# Patient Record
Sex: FEMALE | Race: WHITE
[De-identification: names, ages, dates, MRNs, and addresses within clinical notes are randomized per-mention and may not be internally consistent; named-entity substitution may affect disease eponyms.]

---

## 2017-06-27 ENCOUNTER — HOSPITAL ENCOUNTER (INPATIENT)
Dept: HOSPITAL 83 - ED | Age: 82
LOS: 3 days | Discharge: HOME | DRG: 195 | End: 2017-06-30
Attending: INTERNAL MEDICINE | Admitting: INTERNAL MEDICINE
Payer: MEDICARE

## 2017-06-27 VITALS — DIASTOLIC BLOOD PRESSURE: 48 MMHG

## 2017-06-27 VITALS — DIASTOLIC BLOOD PRESSURE: 82 MMHG

## 2017-06-27 VITALS — DIASTOLIC BLOOD PRESSURE: 58 MMHG

## 2017-06-27 VITALS — DIASTOLIC BLOOD PRESSURE: 84 MMHG

## 2017-06-27 VITALS — HEIGHT: 60 IN | BODY MASS INDEX: 25.6 KG/M2 | WEIGHT: 130.38 LBS

## 2017-06-27 DIAGNOSIS — J18.9: Primary | ICD-10-CM

## 2017-06-27 DIAGNOSIS — R07.81: ICD-10-CM

## 2017-06-27 DIAGNOSIS — Z88.0: ICD-10-CM

## 2017-06-27 LAB
ALBUMIN SERPL-MCNC: 3.3 GM/DL (ref 3.1–4.5)
ALP SERPL-CCNC: 97 U/L (ref 45–117)
ALT SERPL W P-5'-P-CCNC: 13 U/L (ref 12–78)
AST SERPL-CCNC: 16 IU/L (ref 3–35)
BASOPHILS # BLD AUTO: 0.1 10*3/UL (ref 0–0.1)
BASOPHILS NFR BLD AUTO: 0.9 % (ref 0–1)
BUN SERPL-MCNC: 21 MG/DL (ref 7–24)
CHLORIDE SERPL-SCNC: 103 MMOL/L (ref 98–107)
CK MB SERPL-MCNC: < 0.5 NG/ML (ref 0.5–3.6)
CK SERPL-CCNC: 23 U/L (ref 26–192)
CO2 SERPL-SCNC: 27 MMOL/L (ref 21–32)
CRP SERPL-MCNC: 2.64 MG/DL (ref 0–0.3)
EOSINOPHIL # BLD AUTO: 0.6 10*3/UL (ref 0–0.4)
EOSINOPHIL # BLD AUTO: 8.4 % (ref 1–4)
ERYTHROCYTE [DISTWIDTH] IN BLOOD BY AUTOMATED COUNT: 13.1 % (ref 0–14.5)
GLUCOSE SERPL-MCNC: 93 MG/DL (ref 65–99)
HCT VFR BLD AUTO: 36 % (ref 37–47)
HGB BLD-MCNC: 11.5 G/DL (ref 12–16)
IG #: 0 10*3/UL (ref 0–0.1)
INR BLD: 0.9 (ref 2–3.5)
LYMPHOCYTES # BLD AUTO: 2.1 10*3/UL (ref 1.3–4.4)
LYMPHOCYTES NFR BLD AUTO: 27.8 % (ref 27–41)
MAGNESIUM SERPL-MCNC: 2.9 MG/DL (ref 1.5–2.1)
MCH RBC QN AUTO: 29 PG (ref 27–31)
MCHC RBC AUTO-ENTMCNC: 31.9 G/DL (ref 33–37)
MCV RBC AUTO: 90.7 FL (ref 81–99)
MONOCYTES # BLD AUTO: 0.8 10*3/UL (ref 0.1–1)
MONOCYTES NFR BLD MANUAL: 9.8 % (ref 3–9)
MYOGLOBIN SERPL-MCNC: 53 NG/ML (ref 13–71)
NEUT #: 4 10*3/UL (ref 2.3–7.9)
NEUT %: 52.7 % (ref 47–73)
NRBC BLD QL AUTO: 0 % (ref 0–0)
PLATELET # BLD AUTO: 368 10*3/UL (ref 130–400)
PMV BLD AUTO: 9.5 FL (ref 9.6–12.3)
POTASSIUM SERPL-SCNC: 4.5 MMOL/L (ref 3.5–5.1)
PROT SERPL-MCNC: 7.6 GM/DL (ref 6.4–8.2)
PROTHROMBIN TIME: 9.8 SECONDS (ref 9–12.4)
RBC # BLD AUTO: 3.97 10*6/UL (ref 4.1–5.1)
SODIUM SERPL-SCNC: 138 MMOL/L (ref 136–145)
TROPONIN I SERPL-MCNC: < 0.015 NG/ML (ref ?–0.04)
WBC NRBC COR # BLD AUTO: 7.7 10*3/UL (ref 4.8–10.8)

## 2017-06-28 VITALS — DIASTOLIC BLOOD PRESSURE: 68 MMHG | SYSTOLIC BLOOD PRESSURE: 124 MMHG

## 2017-06-28 VITALS — DIASTOLIC BLOOD PRESSURE: 75 MMHG

## 2017-06-28 VITALS — DIASTOLIC BLOOD PRESSURE: 67 MMHG

## 2017-06-28 VITALS — DIASTOLIC BLOOD PRESSURE: 62 MMHG

## 2017-06-28 VITALS — DIASTOLIC BLOOD PRESSURE: 76 MMHG | SYSTOLIC BLOOD PRESSURE: 146 MMHG

## 2017-06-28 VITALS — DIASTOLIC BLOOD PRESSURE: 68 MMHG

## 2017-06-29 VITALS — SYSTOLIC BLOOD PRESSURE: 130 MMHG | DIASTOLIC BLOOD PRESSURE: 79 MMHG

## 2017-06-29 VITALS — DIASTOLIC BLOOD PRESSURE: 51 MMHG | SYSTOLIC BLOOD PRESSURE: 102 MMHG

## 2017-06-29 VITALS — DIASTOLIC BLOOD PRESSURE: 62 MMHG | SYSTOLIC BLOOD PRESSURE: 100 MMHG

## 2017-06-29 VITALS — DIASTOLIC BLOOD PRESSURE: 71 MMHG

## 2017-06-29 VITALS — SYSTOLIC BLOOD PRESSURE: 138 MMHG | DIASTOLIC BLOOD PRESSURE: 64 MMHG

## 2017-06-30 VITALS — DIASTOLIC BLOOD PRESSURE: 78 MMHG | SYSTOLIC BLOOD PRESSURE: 138 MMHG

## 2017-06-30 VITALS — DIASTOLIC BLOOD PRESSURE: 74 MMHG | SYSTOLIC BLOOD PRESSURE: 136 MMHG

## 2017-06-30 LAB
BASOPHILS # BLD AUTO: 0 10*3/UL (ref 0–0.1)
BASOPHILS NFR BLD AUTO: 0.1 % (ref 0–1)
BUN SERPL-MCNC: 45 MG/DL (ref 7–24)
CHLORIDE SERPL-SCNC: 104 MMOL/L (ref 98–107)
CO2 SERPL-SCNC: 26 MMOL/L (ref 21–32)
EOSINOPHIL # BLD AUTO: 0 % (ref 1–4)
EOSINOPHIL # BLD AUTO: 0 10*3/UL (ref 0–0.4)
ERYTHROCYTE [DISTWIDTH] IN BLOOD BY AUTOMATED COUNT: 13.2 % (ref 0–14.5)
GLUCOSE SERPL-MCNC: 123 MG/DL (ref 65–99)
HCT VFR BLD AUTO: 31.8 % (ref 37–47)
HGB BLD-MCNC: 10.2 G/DL (ref 12–16)
IG #: 0.1 10*3/UL (ref 0–0.1)
LYMPHOCYTES # BLD AUTO: 1.5 10*3/UL (ref 1.3–4.4)
LYMPHOCYTES NFR BLD AUTO: 11.7 % (ref 27–41)
MCH RBC QN AUTO: 28.8 PG (ref 27–31)
MCHC RBC AUTO-ENTMCNC: 32.1 G/DL (ref 33–37)
MCV RBC AUTO: 89.8 FL (ref 81–99)
MONOCYTES # BLD AUTO: 0.5 10*3/UL (ref 0.1–1)
MONOCYTES NFR BLD MANUAL: 4.3 % (ref 3–9)
NEUT #: 10.4 10*3/UL (ref 2.3–7.9)
NEUT %: 83.3 % (ref 47–73)
NRBC BLD QL AUTO: 0 10*3/UL (ref 0–0)
PLATELET # BLD AUTO: 343 10*3/UL (ref 130–400)
PMV BLD AUTO: 10 FL (ref 9.6–12.3)
POTASSIUM SERPL-SCNC: 4.4 MMOL/L (ref 3.5–5.1)
RBC # BLD AUTO: 3.54 10*6/UL (ref 4.1–5.1)
SODIUM SERPL-SCNC: 139 MMOL/L (ref 136–145)
WBC NRBC COR # BLD AUTO: 12.5 10*3/UL (ref 4.8–10.8)

## 2017-08-30 ENCOUNTER — HOSPITAL ENCOUNTER (INPATIENT)
Dept: HOSPITAL 83 - 5E | Age: 82
LOS: 6 days | Discharge: HOME | DRG: 177 | End: 2017-09-05
Attending: INTERNAL MEDICINE | Admitting: INTERNAL MEDICINE
Payer: MEDICARE

## 2017-08-30 VITALS — HEIGHT: 58.98 IN | BODY MASS INDEX: 23.64 KG/M2 | WEIGHT: 117.25 LBS

## 2017-08-30 VITALS — DIASTOLIC BLOOD PRESSURE: 71 MMHG

## 2017-08-30 VITALS — SYSTOLIC BLOOD PRESSURE: 125 MMHG | DIASTOLIC BLOOD PRESSURE: 63 MMHG

## 2017-08-30 VITALS — DIASTOLIC BLOOD PRESSURE: 64 MMHG

## 2017-08-30 DIAGNOSIS — Z87.81: ICD-10-CM

## 2017-08-30 DIAGNOSIS — J15.6: Primary | ICD-10-CM

## 2017-08-30 DIAGNOSIS — D50.9: ICD-10-CM

## 2017-08-30 DIAGNOSIS — G89.29: ICD-10-CM

## 2017-08-30 DIAGNOSIS — I10: ICD-10-CM

## 2017-08-30 DIAGNOSIS — Z79.899: ICD-10-CM

## 2017-08-30 DIAGNOSIS — J96.01: ICD-10-CM

## 2017-08-30 DIAGNOSIS — M54.5: ICD-10-CM

## 2017-08-30 DIAGNOSIS — I48.0: ICD-10-CM

## 2017-08-30 DIAGNOSIS — K26.4: ICD-10-CM

## 2017-08-30 DIAGNOSIS — R62.7: ICD-10-CM

## 2017-08-30 DIAGNOSIS — Z87.891: ICD-10-CM

## 2017-08-30 DIAGNOSIS — E87.6: ICD-10-CM

## 2017-08-30 DIAGNOSIS — E03.9: ICD-10-CM

## 2017-08-30 DIAGNOSIS — K92.2: ICD-10-CM

## 2017-08-30 DIAGNOSIS — Z88.0: ICD-10-CM

## 2017-08-30 DIAGNOSIS — R63.4: ICD-10-CM

## 2017-08-30 DIAGNOSIS — Z82.49: ICD-10-CM

## 2017-08-30 DIAGNOSIS — D47.3: ICD-10-CM

## 2017-08-30 DIAGNOSIS — M80.00XG: ICD-10-CM

## 2017-08-30 LAB
BASOPHILS # BLD AUTO: 0.1 10*3/UL (ref 0–0.1)
BASOPHILS NFR BLD AUTO: 0.6 % (ref 0–1)
BUN SERPL-MCNC: 13 MG/DL (ref 7–24)
CHLORIDE SERPL-SCNC: 100 MMOL/L (ref 98–107)
CREAT SERPL-MCNC: 0.84 MG/DL (ref 0.55–1.02)
EOSINOPHIL # BLD AUTO: 0.1 10*3/UL (ref 0–0.4)
EOSINOPHIL # BLD AUTO: 0.9 % (ref 1–4)
ERYTHROCYTE [DISTWIDTH] IN BLOOD BY AUTOMATED COUNT: 16.1 % (ref 0–14.5)
HCT VFR BLD AUTO: 28.3 % (ref 37–47)
HGB BLD-MCNC: 8.4 G/DL (ref 12–16)
LYMPHOCYTES # BLD AUTO: 2.1 10*3/UL (ref 1.3–4.4)
LYMPHOCYTES NFR BLD AUTO: 25.8 % (ref 27–41)
MCH RBC QN AUTO: 24.6 PG (ref 27–31)
MCHC RBC AUTO-ENTMCNC: 29.7 G/DL (ref 33–37)
MCV RBC AUTO: 82.7 FL (ref 81–99)
MONOCYTES # BLD AUTO: 0.6 10*3/UL (ref 0.1–1)
MONOCYTES NFR BLD MANUAL: 7.7 % (ref 3–9)
NEUT #: 5.1 10*3/UL (ref 2.3–7.9)
NEUT %: 64.5 % (ref 47–73)
NRBC BLD QL AUTO: 0 % (ref 0–0)
PLATELET # BLD AUTO: 501 10*3/UL (ref 130–400)
PMV BLD AUTO: 8.8 FL (ref 9.6–12.3)
POTASSIUM SERPL-SCNC: 3.6 MMOL/L (ref 3.5–5.1)
RBC # BLD AUTO: 3.42 10*6/UL (ref 4.1–5.1)
SODIUM SERPL-SCNC: 137 MMOL/L (ref 136–145)
WBC NRBC COR # BLD AUTO: 8 10*3/UL (ref 4.8–10.8)

## 2017-08-30 NOTE — O
[place], [place]
 
                                      OPERATIVE NOTE
 
        NAME: KIRT DIAZ             ACCT #: [id number]  
        UNIT #: [id number]                        ROOM: 532       
        DOCTOR: MINNIE LARKINCORA             BIRTHDATE: [birth year]
 
 
DOS: 
 
GASTROENDOSCOPIC REPORT.
 
The patient is an 86-year-old who has presented with nausea, vomiting and not
feeling well.  The patient had a panel of blood work done.  She was found to
have microcytic indices of anemia with H and H of 8 and 24.  Basic metabolic
panel was normal.  CT scan of the abdomen and pelvis did not show any acute
inflammatory process, uncomplicated diverticulosis has been noticed, 3.4 cm
abdominal aortic aneurysm was noticed.  Hepatic panel was within normal limits. 
Lipase 100 within normal limits.
 
PAST MEDICAL HISTORY:  Associated with osteoporosis, abdominal pain, atrial
fibrillation _____, hip fracture, hypertension.  Also associated with back pain,
hypothyroidism, spinal compression.
 
PAST SURGICAL HISTORY:  Hip fracture.
 
SOCIAL HISTORY:  Nonsmoker, nonalcohol consumer.
 
FAMILY HISTORY:  Noncontributory.
 
ALLERGIES:  PENICILLIN.
 
PROCEDURE:  Today's procedure part of investigation is panendoscopy plus biopsy
and colonoscopy.
 
PREMEDICATION:  Versed and Diprivan.
 
SCOPE:  Olympus forward-viewing gastroscope Q10 video.
 
REPORT:  After putting the patient in the left lateral position and after
application of lubricant to the scope, the scope was introduced.  Thereafter,
under direct visualization, I advanced through the length of esophagus without
difficulty.  Gastric pouch was entered.  Gastritis seen.  A giant duodenal ulcer
was identified.  The base of which is scarred and not actively bleeding. 
Distant margin of the ulcer was biopsied.  Antrum was biopsied.  The patient
extubated, tolerated procedure well.
 
IMPRESSION:  Giant duodenal ulcer.
 
PLAN AND DISCUSSION:  We are going to treat the patient with Protonix IV 40 mg
b.i.d.  We are going to start her on sucralfate, Carafate 2 grams 2 hours before
meals and at bedtime intermittently.  We are going to give her Gaviscon 15 mL
every 3 hours, alternating with Carafate and full liquid diet and clinical
reassessment.  Meanwhile, we are going to proceed with colonoscopy to rule out
occult malignancy of colon.
 
 
 
 
                              [place], [place]
 
                                      OPERATIVE NOTE
 
        NAME: KIRT DIAZ             ACCT #: [id number]  
        UNIT #: [id number]                        ROOM: [facility]       
        DOCTOR: CORA HARTMAN MD             BIRTHDATE: [birth year]
 
 
_________________________________
CORA HARTMAN MD
 
CM:OPRECORD:OPERATIVE NOTE
 
D: 09/01/17 1039
T: 09/01/17 1236
    
                                                            
CORA HARTMAN MD            
                                                            
09/01/17
1443
                              
interface

## 2017-08-30 NOTE — PR
[place], [place]
 
                                      PROGRESS NOTE
 
        NAME: KIRT DIAZ            [facility] #: [id number]  
        UNIT #: [id number]                       ROOM: 532       
        DOCTOR: GRAY DELCID MD               BIRTHDATE: [birth year]
 
 
DOS: 
 
SUBJECTIVE:  The patient states that she is starting to feel a little better,
does not have any new complaints.
 
OBJECTIVE:
VITAL SIGNS:  Blood pressure is 120/57, pulse of 82, respirations 18,
temperature 98.4.
LUNGS:  Clear.
HEART:  Regular.
ABDOMEN:  Obese, soft, nontender.
EXTREMITIES:  Without any edema.  Status post endoscopy.
 
ASSESSMENT AND PLAN:
1.  Large duodenal ulcer noticed on endoscopy yesterday.  The patient is on
proton pump inhibitors and Carafate.
2.  Iron-deficiency anemia, on supplements.
3.  Wedge fractures, multiple lumbar vertebrae with postmenopausal osteoporosis.
 Medications have been started.
4.  Hypoxic respiratory failure, acute, with chronic obstructive pulmonary
disease and pneumonia, on IV antibiotics.  The WBC count is normal, hemoglobin
is 6.9.  We will transfuse and start her on parenteral iron supplements in
addition to the p.o.
 
 
 
_________________________________
GRAY DELCID MD
 
CM:PNTRANS
 
D: 09/02/17 0746                 
T: 09/03/17 0042
             
                                                            
GRAY DELCID MD               
                                                            
09/03/17
0042
                              
interface

## 2017-08-30 NOTE — CON
[place], [place]
 
                                 REPORT OF CONSULTATION
 
        NAME: KIRT DIAZ              ACCT #: [id number]  
        UNIT #: [id number]                         ROOM: 532       
        DOCTOR: CORA HARTMAN MD              BIRTHDATE: [birth year]
 
 
DOS: 
 
HISTORY OF PRESENT ILLNESS:  An 86-year-old patient who has presented with
abdominal pain, was found to have a giant duodenal ulcer and severe
diverticulosis.  This is endoscopic findings.  The patient had to be receiving 2
units of packed cells.  H and H improved to 10 and 34 today.  Basic metabolic
panel has been unremarkable.
 
PAST MEDICAL HISTORY:  Atrial fibrillation, hemorrhoid, diverticulosis,
osteoporosis.
 
PAST SURGICAL HISTORY:  Left hip.
 
SOCIAL HISTORY:  Nonsmoker, nonalcohol consumer.
 
REVIEW OF SYSTEMS:  No hematemesis.  No hematochezia.  Actually, she has had
multiple bowel movements today, yellow in color.  No shortness of breath.  No
chest pain.  However, still continues subxiphoid distress.
 
PHYSICAL EXAMINATION:
VITAL SIGNS:  Stable.
HEENT:  Benign.
NECK:  Supple.  No thyromegaly.
CHEST:  Symmetric anatomy, equal expansion.
HEART:  Normal sinus rhythm.  No gallop.  No murmur.
ABDOMEN:  Soft.  No hepato-organomegaly.  Bowel sounds present.
EXTREMITIES:  No cyanosis.  No pedal edema.
NEUROLOGIC:  Alert and oriented.
 
IMPRESSION AND PLAN:  Severe diverticulosis, giant duodenal ulcer.  We are going
to continue with double dose Protonix 40 mg IV b.i.d.  We are going to continue
with sucralfate therapy, slurry 2 grams q. 6 hours.  She is demanding solid food
and she wants to pick and choose from her diet.  However, we will remain
aggressive double dose therapy for next month and after that we are going to
then reduce to 1 Protonix 40 mg daily and Carafate q.i.d.  The cause of her
problem associated with diclofenac and we are going to avoid above.
 
OTHER ADJUNCTIVE DIAGNOSES:  As dictated.
 
 
 
 
                              [place], [place]
 
                                 REPORT OF CONSULTATION
 
        NAME: KIRT DIAZ              ACCT #: [id number]  
        UNIT #: [id number]                         ROOM: 532       
        DOCTOR: CORA HARTMAN MD              BIRTHDATE: [birth year]
 
 
_________________________________
CORA HARTMAN MD
 
CM:CONSTR:REPORT OF CONSULTATION
 
D: 09/04/17 1605   T: 09/04/17 09/04/17     2303                                          interface

## 2017-08-30 NOTE — PR
[place], [place]
 
                                      PROGRESS NOTE
 
        NAME: KIRT DIAZ            [facility] #: [id number]  
        UNIT #: [id number]                       ROOM: 532       
        DOCTOR: GRAY DELCID MD               BIRTHDATE: [birth year]
 
 
DOS: 09/05/2017
 
SUBJECTIVE:  The patient is doing better, does not have any complaints.  Denies
any chest pains, palpitations or shortness of breath.  She thinks she may have
slight burning when she urinates.  She has had 3 bowel movements yesterday.
 
OBJECTIVE:
VITAL SIGNS:  Graphic trend this morning shows a pressure of 136/72, pulse is
76, respirations 20, temperature 98.6.
LUNGS:  Clear.
HEART:  Regular.
ABDOMEN:  Obese, soft, nontender.
EXTREMITIES:  Without any edema.
 
LABORATORY DATA:  No labs available this morning.  _______ , it was negative. 
Chest x-ray shows atelectasis with basilar process for which she is already on
antibiotics.  Incidentally to be noted is that knee x-ray on admission was
negative.  The pneumonia was actually seen on a CT of the chest.
 
ASSESSMENT AND PLAN:
1.  Pneumonia, right upper lobe as well as basilar, possible gram-negative,
improving.
2. Chronic obstructive pulmonary disease with acute hypoxic respiratory failure,
hypoxemia is corrected.  The patient does not have any exacerbations.
3. Precipitous drop in hematocrit, corrected.
4. Benign hypertension, controlled.
5. Duodenal ulcer, on medications.
 
Plan is to discharge her to home today to follow up as an outpatient.
 
 
 
_________________________________
GRAY DELCID MD
 
CM:PNTRANS
 
D: 09/05/17 0815                 
T: 09/05/17 0919
             
                                                            
GRAY DELCID MD               
                                                            
09/05/17
0919
                              
interface

## 2017-08-30 NOTE — PR
[place], [place]
 
                                      PROGRESS NOTE
 
        NAME: KIRT DIAZ            [id number]  
        UNIT #: [id number]                       ROOM: 532       
        DOCTOR: GRAY DELCID MD               BIRTHDATE: [birth year]
 
 
DOS: 
 
SUBJECTIVE:  The patient states that she feels better.  She is not having any
cough or shortness of breath.  Oxygen saturation has improved after the
antibiotics were started.
 
OBJECTIVE:
VITAL SIGNS:  Graphic trend shows pressure 110/81, pulse of 90, respirations 20,
temperature 98.2.
LUNGS:  Diminished breath sounds.  No wheezes, rales or rhonchi heard this
morning.
HEART:  Regular.
ABDOMEN:  Obese, soft.
EXTREMITIES:  Without any edema.
 
ASSESSMENT AND PLAN:
1.  Abdominal pain with weight loss and iron deficiency anemia.  Awaiting
endoscopy, colonoscopy this morning.
2.  Wedge fractures of multiple lumbar vertebrae with chronic pain controlled
with oxycodone.
3.  Postmenopausal osteoporosis, add Fosamax.
4.  Acute hypoxic respiratory failure with CT of the chest showing emphysematous
changes as well as right upper lobe and basilar opacity suggesting early
pneumonia.  The patient was placed in antibiotics.  We will also add DuoNeb.
 
 
 
_________________________________
GRAY DELCID MD
 
CM:PNTRANS
 
D: 09/01/17 0916                 
T: 09/01/17 1020
             
                                                            
GRAY DELCID MD               
                                                            
09/02/17
0018
                              
interface

## 2017-08-30 NOTE — WRIGHTHP
[place], [place]
 
                               PATIENT HISTORY AND PHYSICAL EXAM
 
        NAME: KIRT DIAZ             [facility] #: [id number]  
        UNIT #: [id number]                        ROOM: 532       
        DOCTOR: GRAY DELCID MD                BIRTHDATE: [birth year]
 
 
DOS: 08/30/2017
 
HISTORY OF PRESENT ILLNESS:  The patient is 86 years old who was admitted with
complaints of difficulty with weight loss and nausea and emesis.  The patient
has had a history of vertebral fractures, was on pain medications at home, but
continued to worsen, was seen by  ____ as an outpatient, was placed on
Cymbalta for chronic pain.  This as per the patient and the family made her
increasingly nauseous.  She has been throwing up for the last several days and
so quit taking her Cymbalta is impart.  She has also lost about 30 pounds in the
last 1 month.  Denies having any chest pains or palpitations.  Complains of a
bloating feeling in abdomen and continued abdominal discomfort and does not have
any diarrhea, any fever or chills.  Does not have any chest pains or
palpitations.
 
PAST MEDICAL HISTORY:  Significant for:
1.  Multiple compression fractures of the lumbar spine.
2.  Postmenopausal osteoporosis.
3.  History of diverticular bleed.
4.  Benign hypertension.
5.  History of lone atrial fibrillation.
6.  History of hip fracture with repair.
 
MEDICATIONS:  She is on Cardizem, diclofenac, duloxetine and levothyroxine.
 
SOCIAL HISTORY:  Nonsmoker, does not use any alcohol.
 
PHYSICAL EXAMINATION:
GENERAL:  She is awake and alert and oriented.
VITAL SIGNS:  Graphic trend shows pressure 131/60, pulse of 79, respirations 18
and temperature 98.8.
LUNGS:  Diminished breath sounds.  No wheezes, rales or rhonchi heard this
morning.
HEART:  Regular.
ABDOMEN:  Obese, soft, some diffuse tenderness all across the abdomen.
EXTREMITIES:  Without any edema.
 
LABORATORY DATA:  Shows a normal white blood cell count, but hemoglobin is 8.4,
hematocrit 28.3, platelet 501.  BMP:  Glucose 102.  Electrolytes were normal.
 
ASSESSMENT AND PLAN:
1.  The patient with significant weight loss, nausea and emesis, most likely has
acute gastritis.  The patient will be placed on IV Protonix, IV fluids. 
Consultation with Dr. Ashley has been obtained.  CT of the abdomen and pelvis was
ordered.
2.  Benign hypertension, controlled.
3.  Chronic back pain, not any better.  We will discontinue the Cymbalta, which
was causing a lot of nausea and emesis and also place on oxycodone for pain
control.
4.  Hypothyroidism.  Continue medications.
5.  Hypoxic respiratory failure, acute.  This morning the patient's oxygen
 
                              [place], [place]
 
                               PATIENT HISTORY AND PHYSICAL EXAM
 
        NAME: KIRT DIAZ             ACCT #: [id number]  
        UNIT #: [id number]                        ROOM: [facility]       
        DOCTOR: GRAY DELCID MD                BIRTHDATE: [birth year]
 
 
saturation is down to 86 on room air.  We will restart oxygen supplementation,
order chest CT scan.
 
 
 
_________________________________
GRAY DELCID MD
 
CM:HISPHYS:PATIENT HISTORY AND PHYSICAL EXAMINATION
 
D: 08/31/17 0810
T: 08/31/17 0941
    
                                                            
GRAY DELCID MD               
                                                            
08/31/17
0941
                              
interface

## 2017-08-30 NOTE — NUR
Time: 1205
A 86  year old FEMALE admitted to 
under services of GRAY MONTAÑO MD.
Pt. arrived via bed from
MS.  Chief complaint: NAUSEA, VOMITING, ABD PAIN.
 
FELIPE RODRIGUEZ

## 2017-08-30 NOTE — PR
[place], [place]
 
                                      PROGRESS NOTE
 
        NAME: KIRT DIAZ            [facility] #: [id number]  
        UNIT #: [id number]                       ROOM: 532       
        DOCTOR: GRAY DELCID MD               BIRTHDATE: [birth year]
 
 
DOS: 09/04/2017
 
SUBJECTIVE:  The patient states that she is tired, does not have any new
complaints.
 
OBJECTIVE:
VITAL SIGNS:  Blood pressure is 137/69, pulse of 77, respirations 20,
temperature 98.7.
LUNGS:  Clear.
HEART:  Regular.
ABDOMEN:  Obese, soft, nontender.
EXTREMITIES:  Without any edema.
 
LABORATORY DATA:  White cell count is normal, hemoglobin 10.3, platelets 367. 
BMP:  Glucose 87, BUN and creatinine normal.  Potassium is up to 3.2.
 
ASSESSMENT AND PLAN:
1.  Hypokalemia.  Supplementation will be ordered again.
2.  Precipitous drop in hematocrit with iron deficiency anemia, which is
corrected.
3.  Weight loss with a large duodenal ulcer.  The patient has been started on a
diet and is tolerating and has not had any active bleed.
4.  Pneumonia, possible Gram negative.  Chest x-ray will be ordered to see if
clearing and plan is to discharge in a.m.
 
 
 
_________________________________
GRAY DELCID MD
 
CM:PNTRANS
 
D: 09/04/17 0802                 
T: 09/04/17 0923
             
                                                            
GRAY DELCID MD               
                                                            
09/05/17
0515
                              
interface

## 2017-08-30 NOTE — O
[place], [place]
 
                                      OPERATIVE NOTE
 
        NAME: KIRT DIAZ             [id number]  
        [facility] #: [id number]                        ROOM: 532       
        DOCTOR: CORA HARTMAN MD             BIRTHDATE: [birth year]
 
 
DOS: 
 
GASTROENDOSCOPIC REPORT.
 
The patient has presented with chief complaint of anemia, drop in H and H,
undergoing investigation.
 
PROCEDURE:  Today's procedure part of investigation is colonoscopy.
 
PREMEDICATION:  Versed and Diprivan.
 
SCOPE:  Olympus folding colonoscope 10L video.
 
REPORT:  After putting the patient in the left lateral position and after
application of lubricant to the scope, the scope was introduced.  Thereafter,
under direct visualization, I advanced through the length of colon without
difficulty.  Severe diverticulosis of sigmoid colon was identified,
photographed.  Base of the cecum explored, appendiceal orifice identified, and
ileocecal valve was defined.  The patient was gradually extubated, tolerated
procedure well.
 
IMPRESSION:  Severe diverticulosis of sigmoid colon and descending colon.
 
PLAN AND DISCUSSION:  Full liquid diet for the concerns of giant duodenal ulcer,
workup in progress.  Follow up H and H tomorrow, next day and should there will
be a further drop in H and H, we are going to consider transfusion of packed
cell, 1 unit and reassessment.
 
 
 
_________________________________
CORA HARTMAN MD
 
CM:OPRECORD:OPERATIVE NOTE
 
D: 09/01/17 1039
T: 09/01/17 1241
    
                                                            
CORA HARTMAN MD            
                                                            
09/01/17
1443
                              
interface

## 2017-08-30 NOTE — DS
[place], [place]
 
                                    DISCHARGE SUMMARY
 
        NAME: KIRT DIAZ             [facility] #: [id number]  
        UNIT #: [id number]                        ROOM: 532       
        DOCTOR: GRAY DELCID MD                BIRTHDATE: [birth year]
 
 
DOS: 09/05/2017
 
DIAGNOSES:
1.  Large duodenal ulcer.
2.  Precipitous drop in hematocrit.
3.  Bilateral pneumonia, possible gram negative.
4.  Acute hypoxic respiratory failure.
5.  Benign hypertension.
6.  Postmenopausal osteoporosis.
7.  Chronic low back pain with multiple wedge fractures of the lumbar vertebrae.
 
DISCHARGE MEDICATIONS:  Iron 150 daily, oxycodone 5 three times a day p.r.n.,
Cipro 500 mg twice a day for 7 days, Protonix 40 daily, Carafate 1 gram q.i.d.,
Zofran 8 mg twice a day, levothyroxine 0.025 mg daily, diltiazem 180 daily. 
This patient avoids nonsteroidals.
 
HOSPITAL COURSE:  This patient is 86 years old.  She comes in with complaints of
eating poorly, significant weight loss, abdominal pain, nausea and emesis. 
Please refer to H and P for details.  After admission, the patient was placed on
IV fluids, IV Protonix.  Dr. Ashley was consulted for an endoscopy.
 
The patient was noted to have hypoxemia, oxygen saturation in the low 80s.  CT
of the chest showed bilateral pneumonia.  IV antibiotics were added along with
breathing treatments which was later changed to incentive spirometry.  The
patient underwent the endoscopy and was noted to have a large duodenal ulcer. 
Her count later dropped to 6.5 and required 2 units of blood transfusion.  She
was also noted to be iron deficient and was started on both paracentral and p.o.
iron supplements.  The patient's count has come up and it stabilized around
10.4.  The patient is not having any new complaints, back pain is controlled
with oxycodone.  She is advised not to take any nonsteroidals.  I did start her
on Fosamax again for osteoporosis, but knowing that she has a large duodenal
ulcer, I would avoid any medicines which can cause further irritation, so
Fosamax has been stopped for right now.  I will start it at a later date once
the ulcer completely heals.  The patient will have visiting nurses, PT, OT at
home.
 
DISCHARGE MEDICATIONS:  As above.
 
DIET:  As tolerated.
 
 
 
 
                              [place], [place]
 
                                    DISCHARGE SUMMARY
 
        NAME: KIRT DIAZ             ACCT #: [id number]  
        UNIT #: [id number]                        ROOM: [facility]       
        DOCTOR: GRAY DELCID MD                BIRTHDATE: [birth year]
 
 
_________________________________
GRAY DELCID MD
 
CM:TACHO
 
D: 09/05/17 0818
T: 09/05/17 0933
    
                                                            
GRAY DELCID MD               
                                                            
09/05/17
1653
                              
interface

## 2017-08-30 NOTE — PR
[place], [place]
 
                                      PROGRESS NOTE
 
        NAME: KIRT DIAZ            [facility] #: [id number]  
        UNIT #: [id number]                       ROOM: 532       
        DOCTOR: GRAY DELCID MD               BIRTHDATE: [birth year]
 
 
DOS: 
 
SUBJECTIVE:  The patient states that she had quite a lot of diarrhea yesterday,
but she does not have any abdominal pain.
 
OBJECTIVE:
VITAL SIGNS:  Blood pressure is 106/56, pulse of 90, respirations 18,
temperature 98.3.
LUNGS:  Clear.
HEART:  Regular.
ABDOMEN:  Soft, some minimal tenderness in the epigastric region.
EXTREMITIES:  Without any edema.
 
LABORATORY DATA:  WBC count is 6.6, hemoglobin 10.7, hematocrit 33.5, platelets
393.  BMP:  Glucose 112, BUN 2, creatinine 0.77.  Sodium 141, potassium 2.7,
chloride 105, bicarbonate 25.
 
ASSESSMENT AND PLAN:
1.  Large duodenal ulcer without any active bleed.  We will start her on a
regular diet today, but advised the patient not take anything fried or spicy,
stick to a bland diet.
2.  Precipitous drop in hematocrit.  The patient was transfused iron supplements
IV and p.o. has already been ordered for underlying iron deficiency anemia.
3.  Pneumonia, possible Gram-negative on IV antibiotics.  
4.  Adult failure to thrive.  Get PT/OT to see her and hopefully discharge by
Tuesday.
5.  Postmenopausal osteoporosis with multiple lumbar wedge fracture, continue
pain management.
 
 
 
_________________________________
GRAY DELCID MD
 
CM:PNTRANS
 
D: 09/03/17 0801                 
T: 09/03/17 0932
             
                                                            
GRAY DELCID MD               
                                                            
09/04/17
0229
                              
interface

## 2017-08-31 VITALS — DIASTOLIC BLOOD PRESSURE: 56 MMHG

## 2017-08-31 VITALS — DIASTOLIC BLOOD PRESSURE: 54 MMHG

## 2017-08-31 VITALS — DIASTOLIC BLOOD PRESSURE: 48 MMHG

## 2017-08-31 VITALS — DIASTOLIC BLOOD PRESSURE: 60 MMHG

## 2017-08-31 LAB
ALBUMIN SERPL-MCNC: 2.6 GM/DL (ref 3.1–4.5)
ALP SERPL-CCNC: 85 U/L (ref 45–117)
ALT SERPL W P-5'-P-CCNC: 9 U/L (ref 12–78)
AST SERPL-CCNC: 11 IU/L (ref 3–35)
FERRITIN SERPL-MCNC: 25.5 NG/ML (ref 10–291)
IRON SERPL-MCNC: 23 UG/DL (ref 50–170)
LIPASE SERPL-CCNC: 100 U/L (ref 73–393)
PROT SERPL-MCNC: 6.6 GM/DL (ref 6.4–8.2)
TIBC SERPL-MCNC: 262 UG/DL (ref 250–450)

## 2017-08-31 NOTE — NUR
CALL PLACED TO  FOR CONSULT, DR. HARTMAN STATED HE WAS IN THE MIDDLE OF
A PROCEDURE AND WOULD CALL BACK

## 2017-08-31 NOTE — NUR
in to talk to patient.
Patient states lives at [address]  with HER DAUGHTER.
There are 0 steps in the home.
Physician: DR DELCID
Pharmacy: [facility]
Home health services: NONE
Patient's level of ADLs: MINIMAL ASSIST
Patient has working utilities: YES
DME: HOSP BED/WC/[facility]
Follow-up physician's appointment after d/c: PREFERS TO MAKE HER OAN APPT
Does patient want to access PORTAL?:
Discharge plan HOME.
 
DAPHNIE CHI

## 2017-08-31 NOTE — NUR
PT C/O H/A, RATES PAIN 8/10, REQUESTED PRN PAIN MEDICATION, ADMINSITERED
ROXICET PO PRN PER ORDERS, WILL MONITOR EFFECTS

## 2017-09-01 VITALS — DIASTOLIC BLOOD PRESSURE: 53 MMHG

## 2017-09-01 VITALS — SYSTOLIC BLOOD PRESSURE: 153 MMHG | DIASTOLIC BLOOD PRESSURE: 72 MMHG

## 2017-09-01 VITALS — DIASTOLIC BLOOD PRESSURE: 57 MMHG

## 2017-09-01 VITALS — DIASTOLIC BLOOD PRESSURE: 69 MMHG | SYSTOLIC BLOOD PRESSURE: 125 MMHG

## 2017-09-01 VITALS — DIASTOLIC BLOOD PRESSURE: 69 MMHG

## 2017-09-01 VITALS — DIASTOLIC BLOOD PRESSURE: 60 MMHG | SYSTOLIC BLOOD PRESSURE: 130 MMHG

## 2017-09-01 VITALS — SYSTOLIC BLOOD PRESSURE: 133 MMHG | DIASTOLIC BLOOD PRESSURE: 61 MMHG

## 2017-09-01 VITALS — DIASTOLIC BLOOD PRESSURE: 81 MMHG

## 2017-09-01 PROCEDURE — 0DB68ZX EXCISION OF STOMACH, VIA NATURAL OR ARTIFICIAL OPENING ENDOSCOPIC, DIAGNOSTIC: ICD-10-PCS | Performed by: INTERNAL MEDICINE

## 2017-09-01 PROCEDURE — 0DJD8ZZ INSPECTION OF LOWER INTESTINAL TRACT, VIA NATURAL OR ARTIFICIAL OPENING ENDOSCOPIC: ICD-10-PCS | Performed by: INTERNAL MEDICINE

## 2017-09-01 NOTE — NUR
RESTING IN BED WITH NO DISTRESS NOTED. RESPIRATIONS EASY. LUNGS DIMINISHED
WITH I&E WHEEZES. PULSE OX 90% RA, O2 PRESENT AT BEDSIDE BUT PATIENT REFUSING
TO USE D/T "I DON'T WANT TO BE DEPENDENT ON IT". TRACE BLE EDEMA NOTED. IV
FLUIDS INFUSING PER ORDER. CALL LIGHT WITHIN REACH.

## 2017-09-01 NOTE — NUR
[facility] REFER TO PAPER MAR AND WRITTEN
PROGRESS NOTES.

## 2017-09-01 NOTE — NUR
C/O BACK PAIN RATING A 5, MEDICATED WITH ROXICODONE PER PRN ORDER. CALL LIGHT
WITHIN REACH. WILL MONITOR FOR EFFECTIVENESS

## 2017-09-02 VITALS — DIASTOLIC BLOOD PRESSURE: 70 MMHG | SYSTOLIC BLOOD PRESSURE: 132 MMHG

## 2017-09-02 VITALS — DIASTOLIC BLOOD PRESSURE: 54 MMHG | SYSTOLIC BLOOD PRESSURE: 134 MMHG

## 2017-09-02 VITALS — DIASTOLIC BLOOD PRESSURE: 74 MMHG | SYSTOLIC BLOOD PRESSURE: 140 MMHG

## 2017-09-02 VITALS — DIASTOLIC BLOOD PRESSURE: 70 MMHG | SYSTOLIC BLOOD PRESSURE: 136 MMHG

## 2017-09-02 VITALS — DIASTOLIC BLOOD PRESSURE: 68 MMHG

## 2017-09-02 VITALS — SYSTOLIC BLOOD PRESSURE: 136 MMHG | DIASTOLIC BLOOD PRESSURE: 56 MMHG

## 2017-09-02 VITALS — SYSTOLIC BLOOD PRESSURE: 132 MMHG | DIASTOLIC BLOOD PRESSURE: 56 MMHG

## 2017-09-02 VITALS — DIASTOLIC BLOOD PRESSURE: 62 MMHG | SYSTOLIC BLOOD PRESSURE: 142 MMHG

## 2017-09-02 VITALS — SYSTOLIC BLOOD PRESSURE: 136 MMHG | DIASTOLIC BLOOD PRESSURE: 70 MMHG

## 2017-09-02 VITALS — DIASTOLIC BLOOD PRESSURE: 80 MMHG | SYSTOLIC BLOOD PRESSURE: 134 MMHG

## 2017-09-02 VITALS — SYSTOLIC BLOOD PRESSURE: 136 MMHG | DIASTOLIC BLOOD PRESSURE: 50 MMHG

## 2017-09-02 VITALS — DIASTOLIC BLOOD PRESSURE: 56 MMHG | SYSTOLIC BLOOD PRESSURE: 131 MMHG

## 2017-09-02 VITALS — DIASTOLIC BLOOD PRESSURE: 54 MMHG

## 2017-09-02 VITALS — DIASTOLIC BLOOD PRESSURE: 78 MMHG

## 2017-09-02 VITALS — SYSTOLIC BLOOD PRESSURE: 130 MMHG | DIASTOLIC BLOOD PRESSURE: 56 MMHG

## 2017-09-02 VITALS — DIASTOLIC BLOOD PRESSURE: 60 MMHG

## 2017-09-02 VITALS — DIASTOLIC BLOOD PRESSURE: 66 MMHG

## 2017-09-02 VITALS — DIASTOLIC BLOOD PRESSURE: 57 MMHG

## 2017-09-02 LAB
BASOPHILS # BLD AUTO: 0 10*3/UL (ref 0–0.1)
BASOPHILS NFR BLD AUTO: 0.7 % (ref 0–1)
EOSINOPHIL # BLD AUTO: 0.4 10*3/UL (ref 0–0.4)
EOSINOPHIL # BLD AUTO: 6.3 % (ref 1–4)
ERYTHROCYTE [DISTWIDTH] IN BLOOD BY AUTOMATED COUNT: 15.7 % (ref 0–14.5)
HCT VFR BLD AUTO: 24.1 % (ref 37–47)
HGB BLD-MCNC: 6.9 G/DL (ref 12–16)
LYMPHOCYTES # BLD AUTO: 1.7 10*3/UL (ref 1.3–4.4)
LYMPHOCYTES NFR BLD AUTO: 30.9 % (ref 27–41)
MCH RBC QN AUTO: 24 PG (ref 27–31)
MCHC RBC AUTO-ENTMCNC: 28.6 G/DL (ref 33–37)
MCV RBC AUTO: 84 FL (ref 81–99)
MONOCYTES # BLD AUTO: 0.6 10*3/UL (ref 0.1–1)
MONOCYTES NFR BLD MANUAL: 10.3 % (ref 3–9)
NEUT #: 2.8 10*3/UL (ref 2.3–7.9)
NEUT %: 51.4 % (ref 47–73)
NRBC BLD QL AUTO: 0 10*3/UL (ref 0–0)
PLATELET # BLD AUTO: 418 10*3/UL (ref 130–400)
PMV BLD AUTO: 8.9 FL (ref 9.6–12.3)
RBC # BLD AUTO: 2.87 10*6/UL (ref 4.1–5.1)
WBC NRBC COR # BLD AUTO: 5.5 10*3/UL (ref 4.8–10.8)

## 2017-09-02 PROCEDURE — 30233N1 TRANSFUSION OF NONAUTOLOGOUS RED BLOOD CELLS INTO PERIPHERAL VEIN, PERCUTANEOUS APPROACH: ICD-10-PCS | Performed by: INTERNAL MEDICINE

## 2017-09-02 NOTE — NUR
IV started right hand with #22 angiocath after 1 attempts.
The IV site was prepped with Chloraprep.
Heparin lock attached.
Sterile dressing applied. Patient tolerated precedure well.
Procedure performed according to [facility] policy & procedure.
 
LEXI AVALOS

## 2017-09-03 VITALS — DIASTOLIC BLOOD PRESSURE: 84 MMHG

## 2017-09-03 VITALS — SYSTOLIC BLOOD PRESSURE: 106 MMHG | DIASTOLIC BLOOD PRESSURE: 56 MMHG

## 2017-09-03 VITALS — DIASTOLIC BLOOD PRESSURE: 54 MMHG

## 2017-09-03 LAB
BASOPHILS # BLD AUTO: 0.1 10*3/UL (ref 0–0.1)
BASOPHILS NFR BLD AUTO: 0.9 % (ref 0–1)
BUN SERPL-MCNC: 2 MG/DL (ref 7–24)
CHLORIDE SERPL-SCNC: 105 MMOL/L (ref 98–107)
CREAT SERPL-MCNC: 0.77 MG/DL (ref 0.55–1.02)
EOSINOPHIL # BLD AUTO: 0.5 10*3/UL (ref 0–0.4)
EOSINOPHIL # BLD AUTO: 8.1 % (ref 1–4)
ERYTHROCYTE [DISTWIDTH] IN BLOOD BY AUTOMATED COUNT: 15.6 % (ref 0–14.5)
HCT VFR BLD AUTO: 33.5 % (ref 37–47)
HGB BLD-MCNC: 10.7 G/DL (ref 12–16)
LYMPHOCYTES # BLD AUTO: 2 10*3/UL (ref 1.3–4.4)
LYMPHOCYTES NFR BLD AUTO: 30.4 % (ref 27–41)
MCH RBC QN AUTO: 26.3 PG (ref 27–31)
MCHC RBC AUTO-ENTMCNC: 31.9 G/DL (ref 33–37)
MCV RBC AUTO: 82.3 FL (ref 81–99)
MONOCYTES # BLD AUTO: 0.7 10*3/UL (ref 0.1–1)
MONOCYTES NFR BLD MANUAL: 10.7 % (ref 3–9)
NEUT #: 3.3 10*3/UL (ref 2.3–7.9)
NEUT %: 49.4 % (ref 47–73)
NRBC BLD QL AUTO: 0 % (ref 0–0)
PLATELET # BLD AUTO: 393 10*3/UL (ref 130–400)
PMV BLD AUTO: 8.9 FL (ref 9.6–12.3)
POTASSIUM SERPL-SCNC: 2.7 MMOL/L (ref 3.5–5.1)
RBC # BLD AUTO: 4.07 10*6/UL (ref 4.1–5.1)
SODIUM SERPL-SCNC: 141 MMOL/L (ref 136–145)
WBC NRBC COR # BLD AUTO: 6.6 10*3/UL (ref 4.8–10.8)

## 2017-09-03 NOTE — NUR
PT REQUESTD AND GIVEN OXY IR FOR C/O BACK PAIN .  PT RATES PAIN 6/10.  WILL
MONITOR.  CALL LIGHT WITHIN REACH

## 2017-09-04 VITALS — DIASTOLIC BLOOD PRESSURE: 56 MMHG

## 2017-09-04 VITALS — DIASTOLIC BLOOD PRESSURE: 53 MMHG

## 2017-09-04 VITALS — DIASTOLIC BLOOD PRESSURE: 87 MMHG

## 2017-09-04 VITALS — DIASTOLIC BLOOD PRESSURE: 69 MMHG

## 2017-09-04 LAB
BASOPHILS # BLD AUTO: 0.1 10*3/UL (ref 0–0.1)
BASOPHILS NFR BLD AUTO: 1.1 % (ref 0–1)
BUN SERPL-MCNC: 3 MG/DL (ref 7–24)
CHLORIDE SERPL-SCNC: 107 MMOL/L (ref 98–107)
CREAT SERPL-MCNC: 0.77 MG/DL (ref 0.55–1.02)
EOSINOPHIL # BLD AUTO: 0.7 10*3/UL (ref 0–0.4)
EOSINOPHIL # BLD AUTO: 10.6 % (ref 1–4)
ERYTHROCYTE [DISTWIDTH] IN BLOOD BY AUTOMATED COUNT: 16.1 % (ref 0–14.5)
HCT VFR BLD AUTO: 34.2 % (ref 37–47)
HGB BLD-MCNC: 10.3 G/DL (ref 12–16)
LYMPHOCYTES # BLD AUTO: 2.2 10*3/UL (ref 1.3–4.4)
LYMPHOCYTES NFR BLD AUTO: 34.9 % (ref 27–41)
MCH RBC QN AUTO: 25.3 PG (ref 27–31)
MCHC RBC AUTO-ENTMCNC: 30.1 G/DL (ref 33–37)
MCV RBC AUTO: 84 FL (ref 81–99)
MONOCYTES # BLD AUTO: 0.9 10*3/UL (ref 0.1–1)
MONOCYTES NFR BLD MANUAL: 14.4 % (ref 3–9)
NEUT #: 2.5 10*3/UL (ref 2.3–7.9)
NEUT %: 38.4 % (ref 47–73)
NRBC BLD QL AUTO: 0 % (ref 0–0)
PLATELET # BLD AUTO: 367 10*3/UL (ref 130–400)
PMV BLD AUTO: 8.9 FL (ref 9.6–12.3)
POTASSIUM SERPL-SCNC: 3.2 MMOL/L (ref 3.5–5.1)
RBC # BLD AUTO: 4.07 10*6/UL (ref 4.1–5.1)
SODIUM SERPL-SCNC: 141 MMOL/L (ref 136–145)
WBC NRBC COR # BLD AUTO: 6.4 10*3/UL (ref 4.8–10.8)

## 2017-09-04 NOTE — NUR
PT. IV PULLED OUT. SITE ASYMP IN R ARM. IV started left forearm with #24
angiocath after 2nd attempts.
The IV site was prepped with Chloraprep.
Heparin lock attached.
IV solution 0.9NS WITH 20 MEQ KCL infusing at 60  cc/hr.
Sterile dressing applied. Patient tolerated precedure well.
Procedure performed according to [facility] policy & procedure.
LEFT WRIST HEPLOCK SITE DISCONTINUED SITE SLIGHTLY EDEMATOUS. STERILE DRESSING
TO SITE.
SHIKHA GARCIA
.

## 2017-09-04 NOTE — NUR
MEDICATED WITH PRN OXY IR AS ORDERED FOR PATIENT C/O ABDOMINAL PAIN AT A 6.
WILL FOLLOW UP EFFECTIVENESS.

## 2017-09-04 NOTE — NUR
PATIENT IS RESTING QUIETLY IN BED. RESPIRATIONS EASY/REGULAR. NO SXS OF
DISTRESS AT THIS TIME. C/O STOMACH PAIN AT A 6 . SHE IS PLEASANT/COOPERATIVE
WITH CARE. FLUIDS MAINTAINED PER ORDER. CALL LIGHT IS IN REACH.

## 2017-09-05 VITALS — DIASTOLIC BLOOD PRESSURE: 78 MMHG

## 2017-09-05 VITALS — DIASTOLIC BLOOD PRESSURE: 72 MMHG

## 2017-09-05 NOTE — NUR
PATIENT SLEEPING. NO SXS OF DISTRESS. RESPIRATIONS EASY/REG. FLUIDS MAINTAINED
AS ORDERED. CALL LIGHT IN REACH.

## 2017-09-05 NOTE — NUR
Discharge instructions reviewed with patient/family. Patient receptive and
verbalizes understanding. Follow-up care arranged. Written instructions given
to patient/family.
MELISSA FENG

## 2018-02-01 ENCOUNTER — HOSPITAL ENCOUNTER (EMERGENCY)
Dept: HOSPITAL 83 - ED | Age: 83
Discharge: HOME | End: 2018-02-01
Payer: MEDICARE

## 2018-02-01 VITALS — WEIGHT: 106 LBS | BODY MASS INDEX: 20.81 KG/M2 | HEIGHT: 60 IN

## 2018-02-01 DIAGNOSIS — Z88.0: ICD-10-CM

## 2018-02-01 DIAGNOSIS — Z79.899: ICD-10-CM
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
## 2018-02-01 DIAGNOSIS — Z87.891: ICD-10-CM

## 2018-02-01 DIAGNOSIS — S22.078A: ICD-10-CM

## 2018-02-01 DIAGNOSIS — I48.91: ICD-10-CM

## 2018-02-01 DIAGNOSIS — Z98.890: ICD-10-CM

## 2018-02-01 DIAGNOSIS — S32.058A: Primary | ICD-10-CM

## 2018-02-01 DIAGNOSIS — E03.9: ICD-10-CM
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
## 2018-02-01 DIAGNOSIS — I10: ICD-10-CM

## 2018-02-01 LAB
ALBUMIN SERPL-MCNC: 3.5 GM/DL (ref 3.1–4.5)
ALP SERPL-CCNC: 65 U/L (ref 45–117)
ALT SERPL W P-5'-P-CCNC: 14 U/L (ref 12–78)
AST SERPL-CCNC: 15 IU/L (ref 3–35)
BASOPHILS # BLD AUTO: 0 10*3/UL (ref 0–0.1)
BASOPHILS NFR BLD AUTO: 0.8 % (ref 0–1)
BUN SERPL-MCNC: 11 MG/DL (ref 7–24)
CHLORIDE SERPL-SCNC: 100 MMOL/L (ref 98–107)
CREAT SERPL-MCNC: 0.86 MG/DL (ref 0.55–1.02)
EOSINOPHIL # BLD AUTO: 0.1 10*3/UL (ref 0–0.4)
EOSINOPHIL # BLD AUTO: 2.3 % (ref 1–4)
ERYTHROCYTE [DISTWIDTH] IN BLOOD BY AUTOMATED COUNT: 14.6 % (ref 0–14.5)
HCT VFR BLD AUTO: 45.6 % (ref 37–47)
HGB BLD-MCNC: 15 G/DL (ref 12–16)
LYMPHOCYTES # BLD AUTO: 1.6 10*3/UL (ref 1.3–4.4)
LYMPHOCYTES NFR BLD AUTO: 30.7 % (ref 27–41)
MCH RBC QN AUTO: 29.8 PG (ref 27–31)
MCHC RBC AUTO-ENTMCNC: 32.9 G/DL (ref 33–37)
MCV RBC AUTO: 90.7 FL (ref 81–99)
MONOCYTES # BLD AUTO: 0.5 10*3/UL (ref 0.1–1)
MONOCYTES NFR BLD MANUAL: 9.6 % (ref 3–9)
NEUT #: 2.9 10*3/UL (ref 2.3–7.9)
NEUT %: 55.8 % (ref 47–73)
NRBC BLD QL AUTO: 0 10*3/UL (ref 0–0)
PLATELET # BLD AUTO: 249 10*3/UL (ref 130–400)
PMV BLD AUTO: 9.3 FL (ref 9.6–12.3)
POTASSIUM SERPL-SCNC: 3.5 MMOL/L (ref 3.5–5.1)
PROT SERPL-MCNC: 7.2 GM/DL (ref 6.4–8.2)
RBC # BLD AUTO: 5.03 10*6/UL (ref 4.1–5.1)
SODIUM SERPL-SCNC: 139 MMOL/L (ref 136–145)
WBC NRBC COR # BLD AUTO: 5.2 10*3/UL (ref 4.8–10.8)